# Patient Record
(demographics unavailable — no encounter records)

---

## 2025-04-29 NOTE — PLAN
[FreeTextEntry1] : Pap smear drawn and sent. Patient will call at the start of her next menstrual cycle to schedule a pelvic ultrasound at the end of bleeding so as to check for pathology such as endometrial thickening or fibroids.  Renewal for tranexamic acid sent to the pharmacy. Strongly encourage patient to go for mammogram and proceed with screening colonoscopy.

## 2025-04-29 NOTE — HISTORY OF PRESENT ILLNESS
[Y] : Positive pregnancy history [Currently Active] : currently active [Men] : men [Vaginal] : vaginal [Mammogramdate] : 6/23 [TextBox_19] : AGNES with additional left mammo and sono- Benign [TextBox_31] : Negative [PapSmeardate] : 01/24 [TextBox_43] : Never [LMPDate] : 04/24/25 [PGHxTotal] : 3 [[facility] : [facility] [FreeTextEntry1] : X3 NSVDS [[facility] Hospital[facility]] : 3 [TextBox_28] : Heavy menses and dysmenorrhea.  Uses tranexamic acid for treatment.

## 2025-04-29 NOTE — PHYSICAL EXAM
[Appropriately responsive] : appropriately responsive [Alert] : alert [No Acute Distress] : no acute distress [No Lymphadenopathy] : no lymphadenopathy [Regular Rate Rhythm] : regular rate rhythm [No Murmurs] : no murmurs [Clear to Auscultation B/L] : clear to auscultation bilaterally [Soft] : soft [Non-tender] : non-tender [Non-distended] : non-distended [No HSM] : No HSM [No Lesions] : no lesions [No Mass] : no mass [Oriented x3] : oriented x3 [FreeTextEntry1] : Normal, no lesions [FreeTextEntry2] : Normal, no lesions [FreeTextEntry4] : Normal, no lesions seen or palpated.  Small amount of white discharge in vault [FreeTextEntry5] : Smooth, pink, no lesions.  No cervical motion tenderness [FreeTextEntry6] : Anteverted, small, mobile, nontender.  No adnexal masses or tenderness bilaterally